# Patient Record
Sex: FEMALE | Race: WHITE | NOT HISPANIC OR LATINO | Employment: STUDENT | URBAN - NONMETROPOLITAN AREA
[De-identification: names, ages, dates, MRNs, and addresses within clinical notes are randomized per-mention and may not be internally consistent; named-entity substitution may affect disease eponyms.]

---

## 2023-12-22 ENCOUNTER — OFFICE VISIT (OUTPATIENT)
Dept: OBSTETRICS AND GYNECOLOGY | Facility: CLINIC | Age: 28
End: 2023-12-22
Payer: COMMERCIAL

## 2023-12-22 VITALS
SYSTOLIC BLOOD PRESSURE: 112 MMHG | DIASTOLIC BLOOD PRESSURE: 72 MMHG | WEIGHT: 135 LBS | HEIGHT: 65 IN | BODY MASS INDEX: 22.49 KG/M2

## 2023-12-22 DIAGNOSIS — Z97.5 IUD (INTRAUTERINE DEVICE) IN PLACE: ICD-10-CM

## 2023-12-22 DIAGNOSIS — Z12.4 SCREENING FOR MALIGNANT NEOPLASM OF CERVIX: ICD-10-CM

## 2023-12-22 DIAGNOSIS — Z01.419 WELL WOMAN EXAM WITH ROUTINE GYNECOLOGICAL EXAM: Primary | ICD-10-CM

## 2023-12-22 RX ORDER — DEXTROAMPHETAMINE SACCHARATE, AMPHETAMINE ASPARTATE, DEXTROAMPHETAMINE SULFATE AND AMPHETAMINE SULFATE 5; 5; 5; 5 MG/1; MG/1; MG/1; MG/1
TABLET ORAL 2 TIMES DAILY
COMMUNITY
Start: 2023-12-19

## 2023-12-27 PROBLEM — Z97.5 IUD (INTRAUTERINE DEVICE) IN PLACE: Status: ACTIVE | Noted: 2023-12-27

## 2023-12-27 NOTE — PROGRESS NOTES
"Annual Well Woman Visit    Subjective   Chief Complaint   Patient presents with    Gynecologic Exam     Last pap around 2019, has had not period since 2017, Wednesday started to have some brownish discharge and cramping.     Heather Sifuentes is a 28 y.o. year old  presenting to be seen for an annual well woman visit.    No complaints.  She is essentially amenorrheic with her Mirena IUD since 2019.    She denies sexual dysfunction. She denies urinary complaints including incontinence.    OB Hx: Nulliparous  Contraception: See above  Pap smear: 2019  Mammogram: Never    Past Medical History:   Diagnosis Date    Anxiety     Urinary tract infection      Past Surgical History:   Procedure Laterality Date    MOUTH SURGERY       Family History   Problem Relation Age of Onset    Hyperlipidemia Father     Heart disease Father     Hypertension Father     Diabetes Maternal Grandfather      Social History     Tobacco Use    Smoking status: Never    Smokeless tobacco: Never   Vaping Use    Vaping Use: Never used   Substance Use Topics    Alcohol use: Never    Drug use: Never     (Not in a hospital admission)    Patient has no known allergies.  Current Outpatient Medications on File Prior to Visit   Medication Sig Dispense Refill    amphetamine-dextroamphetamine (ADDERALL) 20 MG tablet 2 (Two) Times a Day.      Levonorgestrel (MIRENA, 52 MG, IU) by Intrauterine route.       No current facility-administered medications on file prior to visit.     Social History    Tobacco Use      Smoking status: Never      Smokeless tobacco: Never      Review of Systems  Pertinent items are noted in HPI, all other systems were reviewed and negative       Objective   /72   Ht 165.1 cm (65\")   Wt 61.2 kg (135 lb)   BMI 22.47 kg/m²     Physical Exam:  General Appearance: alert, pleasant, appears stated age, interactive and cooperative  Breasts: Not performed.  Abdomen: no masses, no hepatomegaly, no splenomegaly, soft non-tender, no " guarding and no rebound tenderness    Pelvis:  Pelvic: Clinical staff was present for exam  External genitalia:  normal appearance of the external genitalia including Bartholin's and [place]'s glands.  :  urethral meatus normal;  Vagina:  normal pink mucosa without prolapse or lesions.  Cervix:  normal appearance. IUD string present   Uterus:  normal size, shape and consistency.  Adnexa:  normal bimanual exam of the adnexa.  Rectal:  digital rectal exam not performed; anus visually normal appearing.       Assessment   Annual well woman exam with age appropriate screening  IUD in place     Plan    No orders of the defined types were placed in this encounter.    Medications ordered: None    Procedures performed: Pap smear    - Mammogram: Not indicated   - Pap screening guidelines reviewed; pap smear collected today  - Yearly clinical breast and pelvic exams recommended regardless of pap recommendations  - Dexa scan: not indicated  - Colonoscopy: not indicated   - Healthy diet and exercise encouraged  - Calcium and Vitamin D requirements reviewed  - Contraception: IUD  - Screening: None    Follow up for annual visits    Trevon Zaragoza MD  Obstetrics and Gynecology  [facility]

## 2024-01-08 LAB — REF LAB TEST METHOD: NORMAL
